# Patient Record
Sex: FEMALE | URBAN - METROPOLITAN AREA
[De-identification: names, ages, dates, MRNs, and addresses within clinical notes are randomized per-mention and may not be internally consistent; named-entity substitution may affect disease eponyms.]

---

## 2017-07-11 ENCOUNTER — APPOINTMENT (RX ONLY)
Dept: URBAN - METROPOLITAN AREA CLINIC 29 | Facility: CLINIC | Age: 27
Setting detail: DERMATOLOGY
End: 2017-07-11

## 2017-07-11 DIAGNOSIS — L70.0 ACNE VULGARIS: ICD-10-CM

## 2017-07-11 PROCEDURE — ? MEDICAL CONSULTATION: CHEMICAL PEELS

## 2017-07-11 PROCEDURE — ? COSMETIC CONSULTATION - MICRO-NEEDLING

## 2017-07-11 NOTE — HPI: FACE (AGING FACE)
How Severe Is It?: moderate
Is This A New Presentation, Or A Follow-Up?: Aging Face
Additional History: Patient with history of autoimmune (Lupus), no history of HIV, HSV, hepatitis, diabetes, not pregnant or breast feeding.   Patient has history of acne and milia for more than 10 years, has used accutane which was discontinued 3 years ago.  Patient is getting  April 28th 2018. Purchased LHA cleanser, phloretin, and Am therapy, scheduled microdermabrasion with light peel.  Recommend microdermabrasion with light peel, followed by jessners peel followed by microdermabrasion with light peel.

## 2017-07-19 ENCOUNTER — APPOINTMENT (RX ONLY)
Dept: URBAN - METROPOLITAN AREA CLINIC 29 | Facility: CLINIC | Age: 27
Setting detail: DERMATOLOGY
End: 2017-07-19

## 2017-07-19 DIAGNOSIS — L70.0 ACNE VULGARIS: ICD-10-CM

## 2017-07-19 PROCEDURE — ? FACIAL

## 2017-07-19 ASSESSMENT — LOCATION SIMPLE DESCRIPTION DERM: LOCATION SIMPLE: RIGHT CHEEK

## 2017-07-19 ASSESSMENT — LOCATION DETAILED DESCRIPTION DERM: LOCATION DETAILED: RIGHT LATERAL BUCCAL CHEEK

## 2017-07-19 ASSESSMENT — LOCATION ZONE DERM: LOCATION ZONE: FACE

## 2017-07-19 NOTE — HPI: PIMPLES (ACNE)
How Severe Is Your Acne?: moderate
Is This A New Presentation, Or A Follow-Up?: Acne
Additional Comments (Use Complete Sentences): Not pregnant or breast feeding, got great results from home care, presents for microderm with mild peel

## 2017-08-09 ENCOUNTER — APPOINTMENT (RX ONLY)
Dept: URBAN - METROPOLITAN AREA CLINIC 29 | Facility: CLINIC | Age: 27
Setting detail: DERMATOLOGY
End: 2017-08-09

## 2017-08-09 DIAGNOSIS — L70.0 ACNE VULGARIS: ICD-10-CM

## 2017-08-09 PROCEDURE — ? FACIAL

## 2017-08-09 ASSESSMENT — LOCATION DETAILED DESCRIPTION DERM
LOCATION DETAILED: LEFT INFERIOR MEDIAL MALAR CHEEK
LOCATION DETAILED: RIGHT CENTRAL MALAR CHEEK
LOCATION DETAILED: RIGHT INFERIOR MEDIAL MALAR CHEEK
LOCATION DETAILED: LEFT LOWER CUTANEOUS LIP
LOCATION DETAILED: LEFT INFERIOR CENTRAL MALAR CHEEK
LOCATION DETAILED: LEFT INFERIOR LATERAL MALAR CHEEK
LOCATION DETAILED: LEFT INFERIOR CENTRAL MALAR CHEEK
LOCATION DETAILED: RIGHT INFERIOR CENTRAL MALAR CHEEK
LOCATION DETAILED: LEFT CENTRAL MALAR CHEEK

## 2017-08-09 ASSESSMENT — LOCATION SIMPLE DESCRIPTION DERM
LOCATION SIMPLE: RIGHT CHEEK
LOCATION SIMPLE: LEFT LIP
LOCATION SIMPLE: LEFT CHEEK
LOCATION SIMPLE: LEFT CHEEK

## 2017-08-09 ASSESSMENT — LOCATION ZONE DERM
LOCATION ZONE: FACE
LOCATION ZONE: FACE
LOCATION ZONE: LIP

## 2017-08-09 NOTE — HPI: PIMPLES (ACNE)
How Severe Is Your Acne?: moderate
Is This A New Presentation, Or A Follow-Up?: Acne
Additional Comments (Use Complete Sentences): No history of HIV, HSV, DIiabetes, autoimmune, hepatitis, not pregnant or breast feeding, has not used Retin A in 5 days, no recent sun exposure.

## 2017-08-09 NOTE — PROCEDURE: FACIAL
Detail Level: Zone
Treatment Type (Optional): Anti Aging Facial
Comments (Sticky): Ageless cleanser, acetone, mucus membranes protected with barrier, 2 layers of 20% lactic/salicylic-resorcinol on non saturated woven gauze with 5 minutes between each application, emollient cream and UV clear spf 46 applied.  Patients tolerated treatment well with minimal redness.
Exfoliation Type: chemical peel

## 2017-08-21 ENCOUNTER — APPOINTMENT (RX ONLY)
Dept: URBAN - METROPOLITAN AREA CLINIC 29 | Facility: CLINIC | Age: 27
Setting detail: DERMATOLOGY
End: 2017-08-21

## 2017-08-21 DIAGNOSIS — L70.0 ACNE VULGARIS: ICD-10-CM

## 2017-08-21 PROCEDURE — ? COSMETIC EXTRACTIONS

## 2017-08-21 ASSESSMENT — LOCATION SIMPLE DESCRIPTION DERM
LOCATION SIMPLE: RIGHT CHEEK
LOCATION SIMPLE: LEFT CHEEK

## 2017-08-21 ASSESSMENT — LOCATION DETAILED DESCRIPTION DERM
LOCATION DETAILED: LEFT INFERIOR CENTRAL MALAR CHEEK
LOCATION DETAILED: LEFT CENTRAL MALAR CHEEK
LOCATION DETAILED: RIGHT SUPERIOR MEDIAL BUCCAL CHEEK
LOCATION DETAILED: RIGHT CENTRAL MALAR CHEEK

## 2017-08-21 ASSESSMENT — LOCATION ZONE DERM: LOCATION ZONE: FACE

## 2017-08-21 NOTE — HPI: PIMPLES (ACNE)
How Severe Is Your Acne?: moderate
Is This A New Presentation, Or A Follow-Up?: Follow Up Acne
Additional Comments (Use Complete Sentences): Patient presents for follow-up 2 weeks after a Jessner's chemical exfoliation, patient is doing well with slight flaking and closed comedones coming to surface. She is quitting smoking which is causing some changes to skin including increased sebum production.  Patient to continue using LHA cleanser, ce ferulic and metacell renewal.

## 2017-08-21 NOTE — PROCEDURE: COSMETIC EXTRACTIONS
Detail Level: Detailed
Render The Number Of Extractions: Yes
Anesthesia Volume In Cc: 0
Consent: The patient's consent was obtained including but not limited to risks of crusting, scabbing, blistering, scarring, darker or lighter pigmentary change, recurrence, incomplete removal and infection.
Post-Care Instructions: I reviewed with the patient in detail post-care instructions. Patient is to wear sunprotection, and avoid picking at any of the treated lesions. Pt may apply Vaseline to crusted or scabbing areas.

## 2017-09-11 ENCOUNTER — APPOINTMENT (RX ONLY)
Dept: URBAN - METROPOLITAN AREA CLINIC 29 | Facility: CLINIC | Age: 27
Setting detail: DERMATOLOGY
End: 2017-09-11

## 2017-09-11 DIAGNOSIS — L70.0 ACNE VULGARIS: ICD-10-CM

## 2017-09-11 PROBLEM — M32.10 SYSTEMIC LUPUS ERYTHEMATOSUS, ORGAN OR SYSTEM INVOLVEMENT UNSPECIFIED: Status: ACTIVE | Noted: 2017-09-11

## 2017-09-11 PROCEDURE — ? FACIAL

## 2017-09-11 ASSESSMENT — LOCATION ZONE DERM: LOCATION ZONE: FACE

## 2017-09-11 ASSESSMENT — LOCATION SIMPLE DESCRIPTION DERM: LOCATION SIMPLE: LEFT CHEEK

## 2017-09-11 ASSESSMENT — LOCATION DETAILED DESCRIPTION DERM: LOCATION DETAILED: LEFT MEDIAL MALAR CHEEK

## 2017-09-11 NOTE — PROCEDURE: FACIAL
Treatment Type (Optional): Deep Cleanse Treatment
Facial Steaming: steamed
Comments (Sticky): Simply clean, steam, dinora c enzyme with ormedic, steam, extractions, toner, SM gel with kena mask, metacell renewal, UV clear.
Detail Level: Zone
Mask Type (Optional): kena-based
Comments (Non-Sticky): Patient purchased micro exfoliant scrub to use up to 3/ week.
Exfoliation Type: enzyme peel

## 2017-09-11 NOTE — HPI: PIMPLES (ACNE)
How Severe Is Your Acne?: moderate
Is This A New Presentation, Or A Follow-Up?: Follow Up Acne
Additional Comments (Use Complete Sentences): Patient is not pregnant or breast feeding.

## 2017-10-23 ENCOUNTER — APPOINTMENT (RX ONLY)
Dept: URBAN - METROPOLITAN AREA CLINIC 29 | Facility: CLINIC | Age: 27
Setting detail: DERMATOLOGY
End: 2017-10-23

## 2017-10-23 DIAGNOSIS — L70.0 ACNE VULGARIS: ICD-10-CM

## 2017-10-23 PROCEDURE — ? FACIAL

## 2017-10-23 PROCEDURE — ? MICRODERMABRASION/FACIAL

## 2017-10-23 ASSESSMENT — LOCATION DETAILED DESCRIPTION DERM
LOCATION DETAILED: RIGHT INFERIOR MEDIAL MALAR CHEEK
LOCATION DETAILED: LEFT MEDIAL MALAR CHEEK

## 2017-10-23 ASSESSMENT — LOCATION ZONE DERM: LOCATION ZONE: FACE

## 2017-10-23 ASSESSMENT — LOCATION SIMPLE DESCRIPTION DERM
LOCATION SIMPLE: RIGHT CHEEK
LOCATION SIMPLE: LEFT CHEEK

## 2017-10-23 NOTE — PROCEDURE: FACIAL
Exfoliation Type: enzyme peel
Detail Level: Zone
Comments (Non-Sticky): Pt purchased HA intensifier and AM therapy.
Mask Type (Optional): calming
Facial Steaming: steamed
Treatment Type (Optional): Deep Cleanse Treatment
Comments (Sticky): Simply clean, dinora c enzyme with 20% lactic, steam extractions, toner, SG gel,  black mask, ce ferulic, metacell renewal, uv clear.
Extraction Method: cotton-tipped applicator

## 2017-10-23 NOTE — PROCEDURE: MICRODERMABRASION/FACIAL
Endpoint: no irritation
Vacuum Pressure: 5
Treatment Number: 1
Indication: skin texture
Post-Care Instructions: I reviewed with the patient in detail post-care instructions. Patient should stay away from the sun and wear sun protection until treated areas are fully healed.
Number Of Passes: 2
Consent: Written consent obtained, risks reviewed including but not limited to crusting, scabbing, blistering, scarring, darker or lighter pigmentary change, bruising, and/or incomplete response.
Facial Steaming: steamed
Detail Level: Zone
Prefacial Cleansing: The face was washed with a cleanser.
Extraction Method: extractor
Prep Text: The patient's skin was cleaned and prepped. Enzyme applied with 20% lactic acid, neutralized, HA mask applied with milk mask on neck , triple lipid and uv daily.

## 2017-10-23 NOTE — HPI: PIMPLES (ACNE)
How Severe Is Your Acne?: mild
Is This A New Presentation, Or A Follow-Up?: Follow Up Acne
Additional Comments (Use Complete Sentences): Pt has a history of autoimmune, no history of hepatitis, diabetes, HIV or HSV.  Not pregnant or breast feeding.

## 2017-12-04 ENCOUNTER — APPOINTMENT (RX ONLY)
Dept: URBAN - METROPOLITAN AREA CLINIC 29 | Facility: CLINIC | Age: 27
Setting detail: DERMATOLOGY
End: 2017-12-04

## 2017-12-04 DIAGNOSIS — L70.0 ACNE VULGARIS: ICD-10-CM

## 2017-12-04 PROCEDURE — ? FACIAL

## 2017-12-04 ASSESSMENT — LOCATION SIMPLE DESCRIPTION DERM: LOCATION SIMPLE: LEFT CHEEK

## 2017-12-04 ASSESSMENT — LOCATION ZONE DERM: LOCATION ZONE: FACE

## 2017-12-04 ASSESSMENT — LOCATION DETAILED DESCRIPTION DERM: LOCATION DETAILED: LEFT INFERIOR CENTRAL MALAR CHEEK

## 2017-12-04 NOTE — HPI: PIMPLES (ACNE)
How Severe Is Your Acne?: mild
Is This A New Presentation, Or A Follow-Up?: Follow Up Acne
Additional Comments (Use Complete Sentences): History of autoimmune disease. Presents for deep cleanse facial.

## 2017-12-04 NOTE — PROCEDURE: FACIAL
Exfoliation Type: enzyme
Mask Type (Optional): calming
Comments (Sticky): Simply clean, micro scrub, steam, detox gel, vit c enzyme with ormedic enzyme, extractions, toner, hydrating HA Mask and , uv clear.
Detail Level: Zone
Treatment Type (Optional): Deep Cleanse Treatment
Extraction Method: cotton-tipped applicator
Facial Steaming: steamed
Price (Use Numbers Only, No Special Characters Or $): 75

## 2018-02-02 ENCOUNTER — APPOINTMENT (RX ONLY)
Dept: URBAN - METROPOLITAN AREA CLINIC 7 | Facility: CLINIC | Age: 28
Setting detail: DERMATOLOGY
End: 2018-02-02

## 2018-02-02 DIAGNOSIS — Z41.9 ENCOUNTER FOR PROCEDURE FOR PURPOSES OTHER THAN REMEDYING HEALTH STATE, UNSPECIFIED: ICD-10-CM

## 2018-02-02 PROCEDURE — ? COSMETIC CONSULTATION: FILLERS

## 2018-02-02 PROCEDURE — ? FILLERS

## 2018-02-02 ASSESSMENT — LOCATION SIMPLE DESCRIPTION DERM: LOCATION SIMPLE: LEFT LIP

## 2018-02-02 ASSESSMENT — LOCATION DETAILED DESCRIPTION DERM
LOCATION DETAILED: LEFT INFERIOR VERMILION LIP
LOCATION DETAILED: LEFT SUPERIOR VERMILION LIP

## 2018-02-02 ASSESSMENT — LOCATION ZONE DERM: LOCATION ZONE: LIP

## 2018-02-02 NOTE — PROCEDURE: FILLERS
Lateral Face Filler Volume In Cc: 0
Expiration Date (Month Year): 11/2018
Include Cannula Information In Note?: No
Map Statment: See Attach Map for Complete Details
Additional Area 1 Location: lip body
Lot #: [id number]
Detail Level: Zone
Additional Area 1 Volume In Cc: 0.4
Price (Use Numbers Only, No Special Characters Or $): 500
Topical Anesthesia?: BLT cream (benzocaine 20%, lidocaine 6%, tetracaine 4%)
Consent: Written consent obtained. Risks include but not limited to bruising, beading, irregular texture, ulceration, infection, allergic reaction, scar formation, incomplete augmentation, temporary nature, procedural pain.
Vermilion Lips Filler Volume In Cc: 0.1
Post-Care Instructions: Patient instructed to apply ice to reduce swelling. Call with any questions or concerns.
Filler: Juvederm Volbella XC

## 2018-02-02 NOTE — HPI: COSMETIC (FILLERS)
Have You Had Fillers Before?: has not had fillers
Additional History: Patient has lupus;well controlled.

## 2018-03-09 ENCOUNTER — APPOINTMENT (RX ONLY)
Dept: URBAN - METROPOLITAN AREA CLINIC 7 | Facility: CLINIC | Age: 28
Setting detail: DERMATOLOGY
End: 2018-03-09

## 2018-03-09 DIAGNOSIS — Z41.9 ENCOUNTER FOR PROCEDURE FOR PURPOSES OTHER THAN REMEDYING HEALTH STATE, UNSPECIFIED: ICD-10-CM

## 2018-03-09 PROCEDURE — ? FILLERS

## 2018-03-09 PROCEDURE — ? COSMETIC QUOTE

## 2018-03-09 PROCEDURE — ? COSMETIC CONSULTATION: FILLERS

## 2018-03-09 ASSESSMENT — LOCATION SIMPLE DESCRIPTION DERM
LOCATION SIMPLE: RIGHT LIP
LOCATION SIMPLE: LEFT CHEEK
LOCATION SIMPLE: LEFT LIP
LOCATION SIMPLE: RIGHT CHEEK

## 2018-03-09 ASSESSMENT — LOCATION DETAILED DESCRIPTION DERM
LOCATION DETAILED: RIGHT UPPER CUTANEOUS LIP
LOCATION DETAILED: RIGHT SUPERIOR VERMILION LIP
LOCATION DETAILED: LEFT UPPER CUTANEOUS LIP
LOCATION DETAILED: LEFT SUPERIOR VERMILION LIP
LOCATION DETAILED: RIGHT INFERIOR MEDIAL MALAR CHEEK
LOCATION DETAILED: RIGHT MEDIAL BUCCAL CHEEK
LOCATION DETAILED: RIGHT INFERIOR VERMILION LIP
LOCATION DETAILED: LEFT INFERIOR LATERAL BUCCAL CHEEK
LOCATION DETAILED: LEFT INFERIOR VERMILION LIP
LOCATION DETAILED: RIGHT INFERIOR CENTRAL BUCCAL CHEEK
LOCATION DETAILED: LEFT MEDIAL BUCCAL CHEEK

## 2018-03-09 ASSESSMENT — LOCATION ZONE DERM
LOCATION ZONE: FACE
LOCATION ZONE: LIP

## 2018-03-09 NOTE — PROCEDURE: FILLERS
Consent: Written consent obtained. Risks include but not limited to bruising, beading, irregular texture, ulceration, infection, allergic reaction, scar formation, incomplete augmentation, temporary nature, procedural pain.
Additional Area 4 Volume In Cc: 0
Nasolabial Folds Filler Volume In Cc: 0.2
Filler: Juvederm Ultra Plus XC
Include Cannula Information In Note?: No
Post-Care Instructions: Patient instructed to apply ice to reduce swelling. Call with any questions or concerns.
Vermilion Lips Filler Volume In Cc: 0.4
Additional Area 1 Location: [facility]
Detail Level: Zone
Reconstitution Date: 3/9/18
Expiration Date (Month Year): 02/28/2019
Topical Anesthesia?: BLT cream (benzocaine 20%, lidocaine 6%, tetracaine 4%)
Price (Use Numbers Only, No Special Characters Or $): 688
Lot #: [id number]
Map Statment: See Attach Map for Complete Details

## 2018-03-09 NOTE — PROCEDURE: COSMETIC QUOTE
Dysport Price Per Unit: 15
Perlane Price Per Syringe: 573
Voluma Price Per Syringe: 277
Discount Percentage: 0
Syringes Of Juvederm: 1
Notice: We have created a more complete Cosmetic Quote plan.  The procedure name is also Cosmetic Quote.  Please review the new plan and hide the Cosmetic Quote plan you do not want to use.
Botox Price Per Unit: 13
Sculptra Price Per Syringe: 500
Detail Level: Simple

## 2018-04-16 ENCOUNTER — APPOINTMENT (RX ONLY)
Dept: URBAN - METROPOLITAN AREA CLINIC 7 | Facility: CLINIC | Age: 28
Setting detail: DERMATOLOGY
End: 2018-04-16

## 2018-04-16 DIAGNOSIS — Z41.9 ENCOUNTER FOR PROCEDURE FOR PURPOSES OTHER THAN REMEDYING HEALTH STATE, UNSPECIFIED: ICD-10-CM

## 2018-04-16 PROCEDURE — ? OTHER

## 2018-04-16 PROCEDURE — ? MICRODERMABRASION

## 2018-04-16 ASSESSMENT — LOCATION ZONE DERM: LOCATION ZONE: FACE

## 2018-04-16 ASSESSMENT — LOCATION DETAILED DESCRIPTION DERM: LOCATION DETAILED: LEFT INFERIOR CENTRAL MALAR CHEEK

## 2018-04-16 ASSESSMENT — LOCATION SIMPLE DESCRIPTION DERM: LOCATION SIMPLE: LEFT CHEEK

## 2018-04-16 NOTE — PROCEDURE: MICRODERMABRASION
Vacuum Pressure Units: inches Hg
Prep Text: The patients skin was cleansed with LHA cleanser, and degreased with acetone. After mocroderm, applied skin brightening enzyme for 5 minutes under steam then cleaned off, extracted, toned, applied revisions Black Mask for 10 minutes then removed with steam towel, applied Triple Lipid and intellishade matte.
Indication: skin texture
Detail Level: Zone
Treatment Number: 1
Vacuum Pressure: 10
Consent: Written consent obtained, risks reviewed including but not limited to crusting, scabbing, blistering, scarring, darker or lighter pigmentary change, bruising, and/or incomplete response.
Wand: Medium
Number Of Passes: 2
Endpoint: mild erythema
Post-Care Instructions: I reviewed with the patient in detail post-care instructions. Patient should stay away from the sun and wear sun protection until treated areas are fully healed.

## 2019-12-18 NOTE — PROCEDURE: FACIAL
12/18/2019    1990  Rei ERNIE Monge  [address] [facility] [address]    To Whom It May Concern:    This is to certify that Rei Monge was evaluated by YUNG Olivas on 12/18/2019 and mother accompanied the patient. Mother can return to work on 12/19/19.          YUNG Olivas    [facility] [address]  [facility] [address]  [address] [address]  [phone]    
Mask Type (Optional): vitamin C
Facial Steaming: steamed
Price (Use Numbers Only, No Special Characters Or $): 125
Treatment Type (Optional): Deep Cleanse Treatment
Detail Level: Zone
Comments (Sticky): Ormedic cleanse, ormedic enzyme, steam, extraction, salicylic mandelic, photo corrective, metacell.    Purchased metacell renewal
Extraction Method: cotton-tipped applicator
Exfoliation Type: enzyme